# Patient Record
Sex: MALE | Race: WHITE | NOT HISPANIC OR LATINO | Employment: FULL TIME | URBAN - METROPOLITAN AREA
[De-identification: names, ages, dates, MRNs, and addresses within clinical notes are randomized per-mention and may not be internally consistent; named-entity substitution may affect disease eponyms.]

---

## 2021-08-18 ENCOUNTER — HOSPITAL ENCOUNTER (EMERGENCY)
Facility: MEDICAL CENTER | Age: 61
End: 2021-08-18
Attending: EMERGENCY MEDICINE
Payer: COMMERCIAL

## 2021-08-18 ENCOUNTER — APPOINTMENT (OUTPATIENT)
Dept: RADIOLOGY | Facility: MEDICAL CENTER | Age: 61
End: 2021-08-18
Attending: EMERGENCY MEDICINE
Payer: COMMERCIAL

## 2021-08-18 VITALS
HEIGHT: 70 IN | TEMPERATURE: 97.4 F | OXYGEN SATURATION: 94 % | WEIGHT: 180 LBS | HEART RATE: 70 BPM | BODY MASS INDEX: 25.77 KG/M2 | RESPIRATION RATE: 16 BRPM | DIASTOLIC BLOOD PRESSURE: 66 MMHG | SYSTOLIC BLOOD PRESSURE: 131 MMHG

## 2021-08-18 DIAGNOSIS — S39.012A LUMBOSACRAL STRAIN, INITIAL ENCOUNTER: ICD-10-CM

## 2021-08-18 PROCEDURE — 96372 THER/PROPH/DIAG INJ SC/IM: CPT

## 2021-08-18 PROCEDURE — 99285 EMERGENCY DEPT VISIT HI MDM: CPT

## 2021-08-18 PROCEDURE — A9270 NON-COVERED ITEM OR SERVICE: HCPCS | Performed by: EMERGENCY MEDICINE

## 2021-08-18 PROCEDURE — 700111 HCHG RX REV CODE 636 W/ 250 OVERRIDE (IP): Performed by: EMERGENCY MEDICINE

## 2021-08-18 PROCEDURE — 700102 HCHG RX REV CODE 250 W/ 637 OVERRIDE(OP): Performed by: EMERGENCY MEDICINE

## 2021-08-18 PROCEDURE — 72100 X-RAY EXAM L-S SPINE 2/3 VWS: CPT

## 2021-08-18 PROCEDURE — 305948 HCHG GREEN TRAUMA ACT PRE-NOTIFY NO CC

## 2021-08-18 RX ORDER — METHOCARBAMOL 500 MG/1
500 TABLET, FILM COATED ORAL EVERY 6 HOURS PRN
Qty: 20 TABLET | Refills: 0 | Status: SHIPPED | OUTPATIENT
Start: 2021-08-18

## 2021-08-18 RX ORDER — METHOCARBAMOL 500 MG/1
500 TABLET, FILM COATED ORAL ONCE
Status: COMPLETED | OUTPATIENT
Start: 2021-08-18 | End: 2021-08-18

## 2021-08-18 RX ORDER — KETOROLAC TROMETHAMINE 30 MG/ML
30 INJECTION, SOLUTION INTRAMUSCULAR; INTRAVENOUS ONCE
Status: COMPLETED | OUTPATIENT
Start: 2021-08-18 | End: 2021-08-18

## 2021-08-18 RX ORDER — IBUPROFEN 600 MG/1
600 TABLET ORAL EVERY 6 HOURS PRN
Qty: 20 TABLET | Refills: 0 | Status: SHIPPED | OUTPATIENT
Start: 2021-08-18

## 2021-08-18 RX ADMIN — KETOROLAC TROMETHAMINE 30 MG: 30 INJECTION, SOLUTION INTRAMUSCULAR; INTRAVENOUS at 10:10

## 2021-08-18 RX ADMIN — METHOCARBAMOL 500 MG: 500 TABLET ORAL at 10:07
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
## 2021-08-18 NOTE — DISCHARGE INSTRUCTIONS
Follow-up with occupational health 1 to 2 days for reevaluation, return to work recommendations, medication management and additional imaging/MRI if symptoms persist.    Activity as tolerated.  Slow stretching range of motion exercises encouraged.    Motrin every 6 hours as needed for discomfort.  Robaxin every 6 hours as needed for pain or spasm.    Return to the emergency department for persistent or worsening back pain, lower extremity weakness or paresthesias, incontinence or retention, abdominal pain, fever, vomiting or other new concerns.

## 2021-08-20 ENCOUNTER — OCCUPATIONAL MEDICINE (OUTPATIENT)
Dept: URGENT CARE | Facility: PHYSICIAN GROUP | Age: 61
End: 2021-08-20
Payer: COMMERCIAL

## 2021-08-20 VITALS
HEIGHT: 70 IN | HEART RATE: 80 BPM | RESPIRATION RATE: 16 BRPM | BODY MASS INDEX: 25.77 KG/M2 | DIASTOLIC BLOOD PRESSURE: 82 MMHG | OXYGEN SATURATION: 97 % | WEIGHT: 180 LBS | TEMPERATURE: 98.7 F | SYSTOLIC BLOOD PRESSURE: 136 MMHG

## 2021-08-20 DIAGNOSIS — S39.012D STRAIN OF LUMBAR REGION, SUBSEQUENT ENCOUNTER: ICD-10-CM
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
## 2021-08-20 PROCEDURE — 99204 OFFICE O/P NEW MOD 45 MIN: CPT | Performed by: PHYSICIAN ASSISTANT
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
## 2021-08-25 ENCOUNTER — OCCUPATIONAL MEDICINE (OUTPATIENT)
Dept: URGENT CARE | Facility: PHYSICIAN GROUP | Age: 61
End: 2021-08-25
Payer: COMMERCIAL

## 2021-08-25 VITALS
DIASTOLIC BLOOD PRESSURE: 80 MMHG | HEIGHT: 70 IN | BODY MASS INDEX: 25.77 KG/M2 | TEMPERATURE: 98.2 F | OXYGEN SATURATION: 97 % | HEART RATE: 64 BPM | WEIGHT: 180 LBS | RESPIRATION RATE: 16 BRPM | SYSTOLIC BLOOD PRESSURE: 126 MMHG

## 2021-08-25 DIAGNOSIS — S39.012A STRAIN OF LUMBAR REGION, INITIAL ENCOUNTER: ICD-10-CM
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
## 2021-08-25 PROCEDURE — 99214 OFFICE O/P EST MOD 30 MIN: CPT | Mod: 29 | Performed by: NURSE PRACTITIONER

## 2021-08-25 ASSESSMENT — ENCOUNTER SYMPTOMS
NAUSEA: 0
CONSTIPATION: 0
CHILLS: 0
FEVER: 0
VOMITING: 0
HEADACHES: 0
BLOOD IN STOOL: 0
TINGLING: 0
DIARRHEA: 0
ABDOMINAL PAIN: 0

## 2021-08-25 ASSESSMENT — LIFESTYLE VARIABLES: SUBSTANCE_ABUSE: 0
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
## 2021-08-25 NOTE — PATIENT INSTRUCTIONS
Back Exercises  The following exercises strengthen the muscles that help to support the trunk and back. They also help to keep the lower back flexible. Doing these exercises can help to prevent back pain or lessen existing pain.  · If you have back pain or discomfort, try doing these exercises 2-3 times each day or as told by your health care provider.  · As your pain improves, do them once each day, but increase the number of times that you repeat the steps for each exercise (do more repetitions).  · To prevent the recurrence of back pain, continue to do these exercises once each day or as told by your health care provider.  Do exercises exactly as told by your health care provider and adjust them as directed. It is normal to feel mild stretching, pulling, tightness, or discomfort as you do these exercises, but you should stop right away if you feel sudden pain or your pain gets worse.  Exercises  Single knee to chest  Repeat these steps 3-5 times for each le. Lie on your back on a firm bed or the floor with your legs extended.  2. Bring one knee to your chest. Your other leg should stay extended and in contact with the floor.  3. Hold your knee in place by grabbing your knee or thigh with both hands and hold.  4. Pull on your knee until you feel a gentle stretch in your lower back or buttocks.  5. Hold the stretch for 10-30 seconds.  6. Slowly release and straighten your leg.  Pelvic tilt  Repeat these steps 5-10 times:  1. Lie on your back on a firm bed or the floor with your legs extended.  2. Bend your knees so they are pointing toward the ceiling and your feet are flat on the floor.  3. Tighten your lower abdominal muscles to press your lower back against the floor. This motion will tilt your pelvis so your tailbone points up toward the ceiling instead of pointing to your feet or the floor.  4. With gentle tension and even breathing, hold this position for 5-10 seconds.  Cat-cow  Repeat these steps until  your lower back becomes more flexible:  1. Get into a hands-and-knees position on a firm surface. Keep your hands under your shoulders, and keep your knees under your hips. You may place padding under your knees for comfort.  2. Let your head hang down toward your chest. Contract your abdominal muscles and point your tailbone toward the floor so your lower back becomes rounded like the back of a cat.  3. Hold this position for 5 seconds.  4. Slowly lift your head, let your abdominal muscles relax and point your tailbone up toward the ceiling so your back forms a sagging arch like the back of a cow.  5. Hold this position for 5 seconds.    Press-ups  Repeat these steps 5-10 times:  1. Lie on your abdomen (face-down) on the floor.  2. Place your palms near your head, about shoulder-width apart.  3. Keeping your back as relaxed as possible and keeping your hips on the floor, slowly straighten your arms to raise the top half of your body and lift your shoulders. Do not use your back muscles to raise your upper torso. You may adjust the placement of your hands to make yourself more comfortable.  4. Hold this position for 5 seconds while you keep your back relaxed.  5. Slowly return to lying flat on the floor.    Bridges  Repeat these steps 10 times:  1. Lie on your back on a firm surface.  2. Bend your knees so they are pointing toward the ceiling and your feet are flat on the floor. Your arms should be flat at your sides, next to your body.  3. Tighten your buttocks muscles and lift your buttocks off the floor until your waist is at almost the same height as your knees. You should feel the muscles working in your buttocks and the back of your thighs. If you do not feel these muscles, slide your feet 1-2 inches farther away from your buttocks.  4. Hold this position for 3-5 seconds.  5. Slowly lower your hips to the starting position, and allow your buttocks muscles to relax completely.  If this exercise is too easy, try  doing it with your arms crossed over your chest.  Abdominal crunches  Repeat these steps 5-10 times:  1. Lie on your back on a firm bed or the floor with your legs extended.  2. Bend your knees so they are pointing toward the ceiling and your feet are flat on the floor.  3. Cross your arms over your chest.  4. Tip your chin slightly toward your chest without bending your neck.  5. Tighten your abdominal muscles and slowly raise your trunk (torso) high enough to lift your shoulder blades a tiny bit off the floor. Avoid raising your torso higher than that because it can put too much stress on your low back and does not help to strengthen your abdominal muscles.  6. Slowly return to your starting position.  Back lifts  Repeat these steps 5-10 times:  1. Lie on your abdomen (face-down) with your arms at your sides, and rest your forehead on the floor.  2. Tighten the muscles in your legs and your buttocks.  3. Slowly lift your chest off the floor while you keep your hips pressed to the floor. Keep the back of your head in line with the curve in your back. Your eyes should be looking at the floor.  4. Hold this position for 3-5 seconds.  5. Slowly return to your starting position.  Contact a health care provider if:  · Your back pain or discomfort gets much worse when you do an exercise.  · Your worsening back pain or discomfort does not lessen within 2 hours after you exercise.  If you have any of these problems, stop doing these exercises right away. Do not do them again unless your health care provider says that you can.  Get help right away if:  · You develop sudden, severe back pain. If this happens, stop doing the exercises right away. Do not do them again unless your health care provider says that you can.  This information is not intended to replace advice given to you by your health care provider. Make sure you discuss any questions you have with your health care provider.  Document Released: 01/25/2006 Document  Revised: 04/23/2020 Document Reviewed: 09/19/2019  Elsevier Patient Education © 2020 [employer]      Lumbosacral Strain  Lumbosacral strain is an injury that causes pain in the lower back (lumbosacral spine). This injury usually occurs from overstretching the muscles or ligaments along your spine. A strain can affect one or more muscles or cord-like tissues that connect bones to other bones (ligaments).  What are the causes?  This condition may be caused by:  · A hard, direct hit (blow) to the back.  · Excessive stretching of the lower back muscles. This may result from:  ? A fall.  ? Lifting something heavy.  ? Repetitive movements such as bending or crouching.  What increases the risk?  The following factors may increase your risk of getting this condition:  · Participating in sports or activities that involve:  ? A sudden twist of the back.  ? Pushing or pulling motions.  · Being overweight or obese.  · Having poor strength and flexibility, especially tight hamstrings or weak muscles in the back or abdomen.  · Having too much of a curve in the lower back.  · Having a pelvis that is tilted forward.  What are the signs or symptoms?  The main symptom of this condition is pain in the lower back, at the site of the strain. Pain may extend (radiate) down one or both legs.  How is this diagnosed?  This condition is diagnosed based on:  · Your symptoms.  · Your medical history.  · A physical exam.  ? Your health care provider may push on certain areas of your back to determine the source of your pain.  ? You may be asked to bend forward, backward, and side to side to assess the severity of your pain and your range of motion.  · Imaging tests, such as:  ? X-rays.  ? MRI.    How is this treated?  Treatment for this condition may include:  · Putting heat and cold on the affected area.  · Medicines to help relieve pain and relax your muscles (muscle relaxants).  · NSAIDs to help reduce swelling and discomfort.  When your  symptoms improve, it is important to gradually return to your normal routine as soon as possible to reduce pain, avoid stiffness, and avoid loss of muscle strength. Generally, symptoms should improve within 6 weeks of treatment. However, recovery time varies.  Follow these instructions at home:  Managing pain, stiffness, and swelling    · If directed, put ice on the injured area during the first 24 hours after your strain.  ? Put ice in a plastic bag.  ? Place a towel between your skin and the bag.  ? Leave the ice on for 20 minutes, 2-3 times a day.  · If directed, put heat on the affected area as often as told by your health care provider. Use the heat source that your health care provider recommends, such as a moist heat pack or a heating pad.  ? Place a towel between your skin and the heat source.  ? Leave the heat on for 20-30 minutes.  ? Remove the heat if your skin turns bright red. This is especially important if you are unable to feel pain, heat, or cold. You may have a greater risk of getting burned.  Activity  · Rest and return to your normal activities as told by your health care provider. Ask your health care provider what activities are safe for you.  · Avoid activities that take a lot of energy for as long as told by your health care provider.  General instructions  · Take over-the-counter and prescription medicines only as told by your health care provider.  · Do not drive or use heavy machinery while taking prescription pain medicine.  · Do not use any products that contain nicotine or tobacco, such as cigarettes and e-cigarettes. If you need help quitting, ask your health care provider.  · Keep all follow-up visits as told by your health care provider. This is important.  How is this prevented?  · Use correct form when playing sports and lifting heavy objects.  · Use good posture when sitting and standing.  · Maintain a healthy weight.  · Sleep on a mattress with medium firmness to support your  back.  · Be safe and responsible while being active to avoid falls.  · Do at least 150 minutes of moderate-intensity exercise each week, such as brisk walking or water aerobics. Try a form of exercise that takes stress off your back, such as swimming or stationary cycling.  · Maintain physical fitness, including:  ? Strength.  ? Flexibility.  ? Cardiovascular fitness.  ? Endurance.  Contact a health care provider if:  · Your back pain does not improve after 6 weeks of treatment.  · Your symptoms get worse.  Get help right away if:  · Your back pain is severe.  · You cannot stand or walk.  · You have difficulty controlling when you urinate or when you have a bowel movement.  · You feel nauseous or you vomit.  · Your feet get very cold.  · You have numbness, tingling, weakness, or problems using your arms or legs.  · You develop any of the following:  ? Shortness of breath.  ? Dizziness.  ? Pain in your legs.  ? Weakness in your buttocks or legs.  ? Discoloration of the skin on your toes or legs.  This information is not intended to replace advice given to you by your health care provider. Make sure you discuss any questions you have with your health care provider.  Document Released: 09/27/2006 Document Revised: 04/10/2020 Document Reviewed: 05/21/2017  Elsevier Patient Education © 2020 Elsevier Inc.

## 2021-09-03 ENCOUNTER — OCCUPATIONAL MEDICINE (OUTPATIENT)
Dept: URGENT CARE | Facility: PHYSICIAN GROUP | Age: 61
End: 2021-09-03
Payer: COMMERCIAL

## 2021-09-03 VITALS
OXYGEN SATURATION: 96 % | HEIGHT: 70 IN | WEIGHT: 180 LBS | RESPIRATION RATE: 16 BRPM | TEMPERATURE: 98.5 F | DIASTOLIC BLOOD PRESSURE: 80 MMHG | BODY MASS INDEX: 25.77 KG/M2 | SYSTOLIC BLOOD PRESSURE: 128 MMHG | HEART RATE: 73 BPM

## 2021-09-03 DIAGNOSIS — S39.012D STRAIN OF LUMBAR REGION, SUBSEQUENT ENCOUNTER: ICD-10-CM

## 2021-09-03 PROCEDURE — 99214 OFFICE O/P EST MOD 30 MIN: CPT | Performed by: PHYSICIAN ASSISTANT

## 2021-09-03 ASSESSMENT — PAIN SCALES - GENERAL: PAINLEVEL: 7=MODERATE-SEVERE PAIN
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
## 2023-04-05 ENCOUNTER — OFFICE VISIT (OUTPATIENT)
Dept: URGENT CARE | Facility: PHYSICIAN GROUP | Age: 63
End: 2023-04-05

## 2023-04-05 VITALS
SYSTOLIC BLOOD PRESSURE: 114 MMHG | HEART RATE: 70 BPM | RESPIRATION RATE: 16 BRPM | TEMPERATURE: 98.1 F | BODY MASS INDEX: 29.08 KG/M2 | DIASTOLIC BLOOD PRESSURE: 62 MMHG | HEIGHT: 68 IN | WEIGHT: 191.84 LBS | OXYGEN SATURATION: 97 %

## 2023-04-05 DIAGNOSIS — Z76.89 ENCOUNTER TO ESTABLISH CARE: ICD-10-CM

## 2023-04-05 PROCEDURE — 99213 OFFICE O/P EST LOW 20 MIN: CPT

## 2023-04-05 NOTE — PROGRESS NOTES
"Subjective:   Sagar Fountain is a 63 y.o. male who presents for Hypertension Follow-up (X 2 days his blood pressure was high. Pt has come to get his blood pressure checked out)      HPI:    Patient presents to urgent care with concerns of HTN.   Reports his DBP was greater than 100 two times while at dentist office for dental cleaning. States the hygienist used a wrist cuff, BP was not verified manually.   Has not rechecked BP at home.  Denies known history of elevated BP, does not use anti-HTN medications  Denies CP, SOB, dizziness, palpitations, leg swelling, cough  Denies headache, visual disturbances, light sensitivity      ROS As above in HPI    Medications:    Current Outpatient Medications on File Prior to Visit   Medication Sig Dispense Refill    ibuprofen (MOTRIN) 600 MG Tab Take 1 Tablet by mouth every 6 hours as needed. (Patient not taking: Reported on 4/5/2023) 20 Tablet 0    methocarbamol (ROBAXIN) 500 MG Tab Take 1 Tablet by mouth every 6 hours as needed. (Patient not taking: Reported on 9/3/2021) 20 Tablet 0     No current facility-administered medications on file prior to visit.        Allergies:   Patient has no known allergies.    Problem List:   There is no problem list on file for this patient.       Surgical History:  No past surgical history on file.    Past Social Hx:   Social History     Tobacco Use    Smoking status: Never    Smokeless tobacco: Never   Vaping Use    Vaping Use: Never used   Substance Use Topics    Alcohol use: Yes     Comment: occasionally    Drug use: Never          Problem list, medications, and allergies reviewed by myself today in Epic.     Objective:     /62 (BP Location: Right arm, Patient Position: Sitting, BP Cuff Size: Adult long)   Pulse 70   Temp 36.7 °C (98.1 °F) (Temporal)   Resp 16   Ht 1.727 m (5' 8\")   Wt 87 kg (191 lb 13.5 oz)   SpO2 97%   BMI 29.17 kg/m²     Physical Exam  Vitals and nursing note reviewed.   Constitutional:       General: " He is not in acute distress.     Appearance: Normal appearance. He is not ill-appearing or diaphoretic.   HENT:      Head: Normocephalic and atraumatic.      Right Ear: Tympanic membrane and ear canal normal.      Left Ear: Tympanic membrane and ear canal normal.      Nose: Nose normal.      Mouth/Throat:      Mouth: Mucous membranes are moist.      Pharynx: Oropharynx is clear.   Eyes:      Extraocular Movements: Extraocular movements intact.      Conjunctiva/sclera: Conjunctivae normal.      Pupils: Pupils are equal, round, and reactive to light.   Cardiovascular:      Rate and Rhythm: Normal rate and regular rhythm.      Heart sounds: Normal heart sounds.   Pulmonary:      Effort: Pulmonary effort is normal.      Breath sounds: Normal breath sounds.   Musculoskeletal:      Cervical back: No rigidity or tenderness.   Lymphadenopathy:      Cervical: No cervical adenopathy.   Skin:     General: Skin is warm and dry.      Capillary Refill: Capillary refill takes less than 2 seconds.      Findings: No rash.   Neurological:      Mental Status: He is alert and oriented to person, place, and time.       Assessment/Plan:     Diagnosis and associated orders:   1. Encounter to establish care  - Referral to establish with [facility] PCP        Comments/MDM:     Monitor BP at home 3 times a day, keep record of readings to bring to PCP appointment  Request dentist/dental staff to recheck BP with manual cuff if BP is elevated at next appointment  Strict return to ER precautions reviewed         Please note that this dictation was created using voice recognition software. I have made a reasonable attempt to correct obvious errors, but I expect that there are errors of grammar and possibly content that I did not discover before finalizing the note.    This note was electronically signed by Raya Hummel DNP

## 2024-11-01 ENCOUNTER — OFFICE VISIT (OUTPATIENT)
Dept: URGENT CARE | Facility: PHYSICIAN GROUP | Age: 64
End: 2024-11-01

## 2024-11-01 VITALS
SYSTOLIC BLOOD PRESSURE: 132 MMHG | TEMPERATURE: 97.2 F | WEIGHT: 191 LBS | OXYGEN SATURATION: 96 % | RESPIRATION RATE: 14 BRPM | HEIGHT: 70 IN | HEART RATE: 81 BPM | BODY MASS INDEX: 27.35 KG/M2 | DIASTOLIC BLOOD PRESSURE: 88 MMHG

## 2024-11-01 DIAGNOSIS — K62.89 RECTAL PAIN: ICD-10-CM

## 2024-11-01 DIAGNOSIS — N41.0 ACUTE PROSTATITIS: ICD-10-CM

## 2024-11-01 PROCEDURE — 99213 OFFICE O/P EST LOW 20 MIN: CPT | Performed by: FAMILY MEDICINE

## 2024-11-01 PROCEDURE — 3079F DIAST BP 80-89 MM HG: CPT | Performed by: FAMILY MEDICINE

## 2024-11-01 PROCEDURE — 3075F SYST BP GE 130 - 139MM HG: CPT | Performed by: FAMILY MEDICINE

## 2024-11-01 RX ORDER — LIDOCAINE HYDROCHLORIDE 30 MG/G
CREAM TOPICAL
Qty: 28 G | Refills: 2 | Status: SHIPPED | OUTPATIENT
Start: 2024-11-01

## 2024-11-01 RX ORDER — LEVOFLOXACIN 500 MG/1
500 TABLET, FILM COATED ORAL DAILY
Qty: 10 TABLET | Refills: 0 | Status: SHIPPED | OUTPATIENT
Start: 2024-11-01 | End: 2024-11-11

## 2024-11-01 ASSESSMENT — ENCOUNTER SYMPTOMS
NAUSEA: 0
WEIGHT LOSS: 0
VOMITING: 0
EYE REDNESS: 0
EYE DISCHARGE: 0
MYALGIAS: 0

## 2024-11-01 NOTE — PROGRESS NOTES
"Subjective     Sagar Fountain is a 64 y.o. male who presents with Rectal Pain (X4days )            4 days rectal pain.  Severe.  Notices pain right after passing stool.  No rectal bleeding.  No fever.  Pain feels \"deep.  Also notices slight pain after urination.  No discharge from anus.  No STI risk factors.  No PMH inflammatory bowel disease.  No other aggravating or alleviating factors.        Review of Systems   Constitutional:  Negative for malaise/fatigue and weight loss.   Eyes:  Negative for discharge and redness.   Gastrointestinal:  Negative for nausea and vomiting.   Musculoskeletal:  Negative for joint pain and myalgias.   Skin:  Negative for itching and rash.              Objective     /88   Pulse 81   Temp 36.2 °C (97.2 °F) (Temporal)   Resp 14   Ht 1.778 m (5' 10\")   Wt 86.6 kg (191 lb)   SpO2 96%   BMI 27.41 kg/m²      Physical Exam  Constitutional:       General: He is not in acute distress.     Appearance: He is well-developed.   HENT:      Head: Normocephalic and atraumatic.   Eyes:      Conjunctiva/sclera: Conjunctivae normal.   Cardiovascular:      Rate and Rhythm: Normal rate and regular rhythm.      Heart sounds: Normal heart sounds. No murmur heard.  Pulmonary:      Effort: Pulmonary effort is normal.      Breath sounds: Normal breath sounds. No wheezing.   Genitourinary:     Comments: Perianal skin tags.  No obvious fissure or active hemorrhoid.  Does not tolerate digital rectal exam.  Skin:     General: Skin is warm and dry.      Findings: No rash.   Neurological:      Mental Status: He is alert.                             Assessment & Plan        Assessment & Plan  Rectal pain    Orders:    Lidocaine HCl 3 % Cream; Apply to affected area every 4 hours as needed    Referral to Gastroenterology    Acute prostatitis    Orders:    levoFLOXacin (LEVAQUIN) 500 MG tablet; Take 1 Tablet by mouth every day for 10 days.     Differential diagnosis, natural history, supportive care, " and indications for immediate follow-up were discussed.     DDx includes prostatitis.  Will treat empirically for this.  Will refer to gastroenterology for further evaluation of rectal pain with urgent care exam limited due to inability to tolerate MARY ANN or anoscope.